# Patient Record
Sex: MALE | Race: WHITE | NOT HISPANIC OR LATINO | Employment: FULL TIME | URBAN - METROPOLITAN AREA
[De-identification: names, ages, dates, MRNs, and addresses within clinical notes are randomized per-mention and may not be internally consistent; named-entity substitution may affect disease eponyms.]

---

## 2019-11-24 ENCOUNTER — HOSPITAL ENCOUNTER (EMERGENCY)
Facility: HOSPITAL | Age: 54
Discharge: HOME | End: 2019-11-24
Attending: EMERGENCY MEDICINE
Payer: COMMERCIAL

## 2019-11-24 VITALS
HEART RATE: 82 BPM | BODY MASS INDEX: 31.62 KG/M2 | HEIGHT: 72 IN | OXYGEN SATURATION: 96 % | TEMPERATURE: 98 F | SYSTOLIC BLOOD PRESSURE: 158 MMHG | DIASTOLIC BLOOD PRESSURE: 87 MMHG | WEIGHT: 233.44 LBS | RESPIRATION RATE: 20 BRPM

## 2019-11-24 DIAGNOSIS — S61.211A LACERATION OF LEFT INDEX FINGER WITHOUT FOREIGN BODY WITHOUT DAMAGE TO NAIL, INITIAL ENCOUNTER: Primary | ICD-10-CM

## 2019-11-24 PROBLEM — I21.4 NSTEMI (NON-ST ELEVATED MYOCARDIAL INFARCTION): Status: ACTIVE | Noted: 2018-07-24

## 2019-11-24 PROBLEM — I21.4 NSTEMI (NON-ST ELEVATED MYOCARDIAL INFARCTION): Status: RESOLVED | Noted: 2018-07-24 | Resolved: 2019-11-24

## 2019-11-24 PROBLEM — K21.9 GERD (GASTROESOPHAGEAL REFLUX DISEASE): Status: ACTIVE | Noted: 2019-10-31

## 2019-11-24 PROBLEM — Z95.5 PRESENCE OF STENT IN CORONARY ARTERY: Status: ACTIVE | Noted: 2018-08-27

## 2019-11-24 PROBLEM — E78.1 HYPERTRIGLYCERIDEMIA: Status: ACTIVE | Noted: 2019-10-31

## 2019-11-24 PROBLEM — I71.20 THORACIC AORTIC ANEURYSM WITHOUT RUPTURE: Status: ACTIVE | Noted: 2019-02-27

## 2019-11-24 PROBLEM — Z96.642 HISTORY OF TOTAL LEFT HIP ARTHROPLASTY: Status: ACTIVE | Noted: 2018-07-24

## 2019-11-24 PROCEDURE — 12001 RPR S/N/AX/GEN/TRNK 2.5CM/<: CPT | Mod: ER

## 2019-11-24 PROCEDURE — 99283 EMERGENCY DEPT VISIT LOW MDM: CPT | Mod: 25,ER

## 2019-11-24 PROCEDURE — 25000003 PHARM REV CODE 250: Mod: ER | Performed by: NURSE PRACTITIONER

## 2019-11-24 RX ORDER — OMEPRAZOLE 20 MG/1
20 CAPSULE, DELAYED RELEASE ORAL DAILY
COMMUNITY

## 2019-11-24 RX ORDER — METOPROLOL SUCCINATE 25 MG/1
25 TABLET, EXTENDED RELEASE ORAL DAILY
COMMUNITY

## 2019-11-24 RX ORDER — ASPIRIN 81 MG/1
81 TABLET ORAL DAILY
COMMUNITY

## 2019-11-24 RX ORDER — RAMIPRIL 2.5 MG/1
2.5 CAPSULE ORAL DAILY
COMMUNITY

## 2019-11-24 RX ORDER — ATORVASTATIN CALCIUM 80 MG/1
80 TABLET, FILM COATED ORAL DAILY
COMMUNITY

## 2019-11-24 RX ORDER — LIDOCAINE HYDROCHLORIDE 10 MG/ML
10 INJECTION, SOLUTION EPIDURAL; INFILTRATION; INTRACAUDAL; PERINEURAL
Status: COMPLETED | OUTPATIENT
Start: 2019-11-24 | End: 2019-11-24

## 2019-11-24 RX ADMIN — LIDOCAINE HYDROCHLORIDE 100 MG: 10 INJECTION, SOLUTION EPIDURAL; INFILTRATION; INTRACAUDAL; PERINEURAL at 02:11

## 2019-11-24 NOTE — ED NOTES
Aaox3, skin warm and dry, resp unlabored and even.amb with steady gait and vickers well.left hand 3rd and 4th finger inner aspect cut mid finger.

## 2019-11-24 NOTE — ED PROVIDER NOTES
Encounter Date: 11/24/2019       History     Chief Complaint   Patient presents with    Laceration     left hand cut with saw just prior to arrival. 3rd and 4th finger     The history is provided by the patient.   Laceration    The incident occurred just prior to arrival. The laceration is located on the left hand (left index finger). The laceration is 1 cm in size. Injury mechanism: cut finger with circular saw. The pain is at a severity of 5/10. The pain has been fluctuating since onset. He reports no foreign bodies present. His tetanus status is UTD (10/31/2019).       PCP:     Aaron Cuenca MD        Review of patient's allergies indicates:  No Known Allergies  Past Medical History:   Diagnosis Date    Coronary artery disease     GERD (gastroesophageal reflux disease) 10/31/2019    History of non-ST elevation myocardial infarction (NSTEMI)     Hypertension     Hypertriglyceridemia 10/31/2019    NSTEMI (non-ST elevated myocardial infarction) 7/24/2018    Osteoarthritis 9/11/2014    Presence of stent in coronary artery 8/27/2018    Psoriasis     Thoracic aortic aneurysm without rupture 2/27/2019     Past Surgical History:   Procedure Laterality Date    HIP ARTHROPLASTY Left      History reviewed. No pertinent family history.  Social History     Tobacco Use    Smoking status: Current Every Day Smoker    Smokeless tobacco: Never Used    Tobacco comment: occas   Substance Use Topics    Alcohol use: Yes     Comment: socially    Drug use: Not on file     Review of Systems   Constitutional: Negative for fever.   HENT: Negative for sore throat.    Respiratory: Negative for shortness of breath.    Cardiovascular: Negative for chest pain.   Gastrointestinal: Negative for nausea.   Genitourinary: Negative for dysuria.   Musculoskeletal: Negative for back pain.   Skin: Positive for wound (laceration of left index finger & abrasion of left middle finger). Negative for rash.   Neurological: Negative for  weakness.   Hematological: Does not bruise/bleed easily.       Physical Exam     Initial Vitals [11/24/19 1418]   BP Pulse Resp Temp SpO2   (!) 158/87 82 20 98.2 °F (36.8 °C) 96 %      MAP       --         Physical Exam    Nursing note and vitals reviewed.  Constitutional: He appears well-developed and well-nourished. He is cooperative. He does not appear ill. No distress.   HENT:   Head: Normocephalic and atraumatic.   Nose: Nose normal.   Mouth/Throat: Uvula is midline, oropharynx is clear and moist and mucous membranes are normal.   Eyes: Conjunctivae, EOM and lids are normal. Pupils are equal, round, and reactive to light.   Neck: Trachea normal and normal range of motion. Neck supple.   Cardiovascular: Normal rate, regular rhythm, intact distal pulses and normal pulses.   Pulmonary/Chest: Effort normal. No accessory muscle usage. No respiratory distress.   Musculoskeletal: Normal range of motion. He exhibits no edema.        Left hand: He exhibits tenderness (at site of laceration - left index finger) and laceration (1 cm laceration noted to palmar aspect of the left index finger just proximal of the DIP - no foreign body noted - wound well-approximated - bleeding controlled with direct pressure - neurovascular intact distally). He exhibits normal range of motion, no bony tenderness, normal two-point discrimination, normal capillary refill and no swelling. Normal sensation noted.        Hands:  The injury does not appear to have damaged any underlying structures including tendons, nerves, blood vessels, and/or joints. There is no suspicion of compartment syndrome or infection at time of examination.    Neurological: He is alert and oriented to person, place, and time. He has normal strength. GCS eye subscore is 4. GCS verbal subscore is 5. GCS motor subscore is 6.   Skin: Skin is warm and dry. Capillary refill takes less than 2 seconds. Abrasion (small abrasion noted to palmar aspect of the left middle finger  "just proximal of DIP - no bleeding or foreign body noted) and laceration (1 cm laceration noted to palmar aspect of the left index finger just proximal of the DIP - no foreign body noted - wound well-approximated - bleeding controlled with direct pressure) noted. No rash noted.   Psychiatric: He has a normal mood and affect. His speech is normal and behavior is normal. Cognition and memory are normal.         ED Course   Lac Repair  Date/Time: 11/24/2019 3:00 PM  Performed by: Galindo Sandy NP  Authorized by: Galindo Sandy NP   Site marked: the operative site was marked  Time out: Immediately prior to procedure a "time out" was called to verify the correct patient, procedure, equipment, support staff and site/side marked as required.  Body area: upper extremity  Location details: left index finger  Laceration length: 1 cm  Foreign bodies: no foreign bodies  Tendon involvement: none  Nerve involvement: none  Vascular damage: no  Anesthesia: local infiltration    Anesthesia:  Local anesthesia used: yes  Local Anesthetic: lidocaine 1% without epinephrine  Anesthetic total: 3 mL  Preparation: Patient was prepped and draped in the usual sterile fashion.  Irrigation solution: saline  Irrigation method: syringe  Amount of cleaning: standard  Debridement: minimal  Degree of undermining: none  Skin closure: 4-0 nylon  Number of sutures: 4  Technique: simple  Approximation: close  Approximation difficulty: simple  Dressing: dressing applied  Patient tolerance: Patient tolerated the procedure well with no immediate complications          ED Medications:   Medications   lidocaine (PF) 10 mg/ml (1%) injection 100 mg (100 mg Other Given 11/24/19 1450)         ED Course Vitals  Vitals:    11/24/19 1418   BP: (!) 158/87   Pulse: 82   Resp: 20   Temp: 98.2 °F (36.8 °C)   TempSrc: Oral   SpO2: 96%   Weight: 105.9 kg (233 lb 7.5 oz)   Height: 6' (1.829 m)         1523 HOURS RE-EVALUATION & DISPOSITION:   Reassessment " at the time of disposition demonstrates that the patient is resting comfortably in no acute distress.  He has remained hemodynamically stable throughout the entire ED visit and is without objective evidence for acute process requiring urgent intervention or hospitalization. I provided counseling to patient with regard to condition, the treatment plan, specific conditions for return, and the importance of follow up.  Answered questions at this time. The patient is stable for discharge.                                                 Clinical Impression:       ICD-10-CM ICD-9-CM   1. Laceration of left index finger without foreign body without damage to nail, initial encounter S61.211A 883.0           Disposition:   Disposition: Discharged  Condition: Stable  I discussed with patient that the evaluation in the emergency department does not suggest any emergent or life threatening medical condition requiring immediate intervention beyond what was provided in the ED, and I believe patient is safe for discharge.  Regardless, an unremarkable evaluation in the ED does not preclude the development or presence of a serious of life threatening condition. As such, patient was instructed to return immediately for any worsening or change in current symptoms. I also discussed the results of my evaluation and diagnostics with patient and he concurs with the evaluation and management plan.  Detailed written and verbal instructions provided to patient and he expressed a verbal understanding of the discharge instructions and overall management plan. Reiterated the importance of medication administration and safety and advised patient to follow up with primary care provider in 3-5 days or sooner if needed.  Also advised patient to return to the ER for any complications.     Regarding LACERATION CARE, patient was instructed to wash hands with soap and warm water before and after caring for wound; keep the wound dry for the first 24 to  48 hours and then gently clean the wound once or twice a day with cool water using soap to clean around the wound; avoid using alcohol or hydrogen peroxide to clean wound unless directed to; and use bandages to keep wound clean and protected and to prevent swelling.  Advised patient to contact primary healthcare provider if wound splits open; becomes extremely painful; appears to not be healing; has a foreign object present; develop a purulent discharge; or note the skin around the wound becoming numb, edematous, or erythematous.  Patient instructed to follow up with primary care provider for wound re-check or closure removal in 8-10 days.        Follow-up Information     Aaron Cuenca MD In 10 days.    Specialty:  Family Medicine  Why:  For suture removal  Contact information:  [address] [place] [address] FAMILY MEDICINE  [facility] [address]  [phone]                                Galindo Sandy NP  11/24/19 9958

## 2020-08-10 ENCOUNTER — HOSPITAL ENCOUNTER (EMERGENCY)
Facility: HOSPITAL | Age: 55
Discharge: HOME | End: 2020-08-10
Attending: EMERGENCY MEDICINE
Payer: COMMERCIAL

## 2020-08-10 VITALS
TEMPERATURE: 98 F | HEART RATE: 59 BPM | BODY MASS INDEX: 31.87 KG/M2 | WEIGHT: 235 LBS | SYSTOLIC BLOOD PRESSURE: 141 MMHG | OXYGEN SATURATION: 97 % | DIASTOLIC BLOOD PRESSURE: 70 MMHG | RESPIRATION RATE: 16 BRPM

## 2020-08-10 VITALS
DIASTOLIC BLOOD PRESSURE: 65 MMHG | WEIGHT: 230 LBS | RESPIRATION RATE: 19 BRPM | OXYGEN SATURATION: 96 % | HEIGHT: 72 IN | BODY MASS INDEX: 31.15 KG/M2 | SYSTOLIC BLOOD PRESSURE: 123 MMHG | HEART RATE: 57 BPM | TEMPERATURE: 98 F

## 2020-08-10 DIAGNOSIS — M54.50 ACUTE RIGHT-SIDED LOW BACK PAIN, UNSPECIFIED WHETHER SCIATICA PRESENT: Primary | ICD-10-CM

## 2020-08-10 DIAGNOSIS — S39.012D LUMBAR STRAIN, SUBSEQUENT ENCOUNTER: Primary | ICD-10-CM

## 2020-08-10 LAB
BILIRUB UR QL STRIP: NEGATIVE
CLARITY UR REFRACT.AUTO: CLEAR
COLOR UR AUTO: YELLOW
GLUCOSE UR QL STRIP: NEGATIVE
HGB UR QL STRIP: NEGATIVE
KETONES UR QL STRIP: NEGATIVE
LEUKOCYTE ESTERASE UR QL STRIP: NEGATIVE
NITRITE UR QL STRIP: NEGATIVE
PH UR STRIP: 6 [PH] (ref 5–8)
PROT UR QL STRIP: NEGATIVE
SP GR UR STRIP: >=1.03 (ref 1–1.03)
URN SPEC COLLECT METH UR: ABNORMAL
UROBILINOGEN UR STRIP-ACNC: NEGATIVE EU/DL

## 2020-08-10 PROCEDURE — 99284 EMERGENCY DEPT VISIT MOD MDM: CPT | Mod: 25,ER

## 2020-08-10 PROCEDURE — 96374 THER/PROPH/DIAG INJ IV PUSH: CPT | Mod: ER

## 2020-08-10 PROCEDURE — 63600175 PHARM REV CODE 636 W HCPCS: Mod: ER | Performed by: EMERGENCY MEDICINE

## 2020-08-10 PROCEDURE — 99284 EMERGENCY DEPT VISIT MOD MDM: CPT | Mod: 25,27,ER

## 2020-08-10 PROCEDURE — 96375 TX/PRO/DX INJ NEW DRUG ADDON: CPT | Mod: ER

## 2020-08-10 PROCEDURE — 96372 THER/PROPH/DIAG INJ SC/IM: CPT | Mod: ER

## 2020-08-10 PROCEDURE — 81003 URINALYSIS AUTO W/O SCOPE: CPT | Mod: ER

## 2020-08-10 RX ORDER — TRAMADOL HYDROCHLORIDE AND ACETAMINOPHEN 37.5; 325 MG/1; MG/1
1 TABLET, FILM COATED ORAL EVERY 6 HOURS PRN
Qty: 12 TABLET | Refills: 0 | OUTPATIENT
Start: 2020-08-10 | End: 2020-08-10

## 2020-08-10 RX ORDER — METHOCARBAMOL 500 MG/1
1000 TABLET, FILM COATED ORAL 3 TIMES DAILY
Qty: 30 TABLET | Refills: 0 | Status: SHIPPED | OUTPATIENT
Start: 2020-08-10 | End: 2020-08-15

## 2020-08-10 RX ORDER — HYDROMORPHONE HYDROCHLORIDE 2 MG/ML
0.5 INJECTION, SOLUTION INTRAMUSCULAR; INTRAVENOUS; SUBCUTANEOUS
Status: COMPLETED | OUTPATIENT
Start: 2020-08-10 | End: 2020-08-10

## 2020-08-10 RX ORDER — OXYCODONE AND ACETAMINOPHEN 10; 325 MG/1; MG/1
1 TABLET ORAL EVERY 6 HOURS PRN
Qty: 12 TABLET | Refills: 0 | Status: SHIPPED | OUTPATIENT
Start: 2020-08-10

## 2020-08-10 RX ORDER — DICLOFENAC SODIUM 75 MG/1
75 TABLET, DELAYED RELEASE ORAL 2 TIMES DAILY
Qty: 10 TABLET | Refills: 1 | Status: SHIPPED | OUTPATIENT
Start: 2020-08-10

## 2020-08-10 RX ORDER — KETOROLAC TROMETHAMINE 30 MG/ML
30 INJECTION, SOLUTION INTRAMUSCULAR; INTRAVENOUS
Status: COMPLETED | OUTPATIENT
Start: 2020-08-10 | End: 2020-08-10

## 2020-08-10 RX ORDER — DEXAMETHASONE SODIUM PHOSPHATE 4 MG/ML
8 INJECTION, SOLUTION INTRA-ARTICULAR; INTRALESIONAL; INTRAMUSCULAR; INTRAVENOUS; SOFT TISSUE
Status: COMPLETED | OUTPATIENT
Start: 2020-08-10 | End: 2020-08-10

## 2020-08-10 RX ORDER — DIAZEPAM 10 MG/2ML
5 INJECTION INTRAMUSCULAR
Status: COMPLETED | OUTPATIENT
Start: 2020-08-10 | End: 2020-08-10

## 2020-08-10 RX ORDER — ORPHENADRINE CITRATE 30 MG/ML
60 INJECTION INTRAMUSCULAR; INTRAVENOUS
Status: COMPLETED | OUTPATIENT
Start: 2020-08-10 | End: 2020-08-10

## 2020-08-10 RX ADMIN — ORPHENADRINE CITRATE 60 MG: 60 INJECTION INTRAMUSCULAR; INTRAVENOUS at 08:08

## 2020-08-10 RX ADMIN — DEXAMETHASONE SODIUM PHOSPHATE 8 MG: 4 INJECTION, SOLUTION INTRAMUSCULAR; INTRAVENOUS at 06:08

## 2020-08-10 RX ADMIN — HYDROMORPHONE HYDROCHLORIDE 0.5 MG: 2 INJECTION, SOLUTION INTRAMUSCULAR; INTRAVENOUS; SUBCUTANEOUS at 05:08

## 2020-08-10 RX ADMIN — KETOROLAC TROMETHAMINE 30 MG: 30 INJECTION, SOLUTION INTRAMUSCULAR; INTRAVENOUS at 08:08

## 2020-08-10 RX ADMIN — DIAZEPAM 5 MG: 10 INJECTION, SOLUTION INTRAMUSCULAR; INTRAVENOUS at 05:08

## 2020-08-10 NOTE — ED PROVIDER NOTES
Encounter Date: 8/10/2020       History     Chief Complaint   Patient presents with    Back Pain     lower back pain onset yesterday     Chief complaint:  low back pain    History of present illness:  55-year-old male with onset of acute right-sided low back pain yesterday.  Patient states he was working on some shoe molding and was in no particular or quit position when he developed right-sided back discomfort.  Upon awakening this morning the patient feels right back stiffness and is unable to walk without significant pain.  He denies any numbness or tingling.  Denies any radiation of the pain into his lower extremities.  Denies any saddle numbness or urinary or fecal incontinence.  Severity of the pain is 10/10.  Location is in the right paralumbar sacral region.  There is no midline discomfort.  Pain is exacerbated by movement.  Pain is relieved by assuming a comfortable posture.  Minimally at best.  No complaints of hematuria or dysuria.        Review of patient's allergies indicates:  No Known Allergies  Past Medical History:   Diagnosis Date    Coronary artery disease     GERD (gastroesophageal reflux disease) 10/31/2019    History of non-ST elevation myocardial infarction (NSTEMI)     Hypertension     Hypertriglyceridemia 10/31/2019    NSTEMI (non-ST elevated myocardial infarction) 7/24/2018    Osteoarthritis 9/11/2014    Presence of stent in coronary artery 8/27/2018    Psoriasis     Thoracic aortic aneurysm without rupture 2/27/2019     Past Surgical History:   Procedure Laterality Date    HIP ARTHROPLASTY Left      History reviewed. No pertinent family history.  Social History     Tobacco Use    Smoking status: Current Every Day Smoker    Smokeless tobacco: Never Used    Tobacco comment: occas   Substance Use Topics    Alcohol use: Yes     Comment: socially    Drug use: Not on file     Review of Systems    Physical Exam     Initial Vitals [08/10/20 0806]   BP Pulse Resp Temp SpO2   (!)  144/76 (!) 55 18 98.4 °F (36.9 °C) 98 %      MAP       --         Physical Exam    Nursing note and vitals reviewed.  Constitutional: He appears well-developed and well-nourished.   HENT:   Head: Atraumatic.   Right Ear: External ear normal.   Left Ear: External ear normal.   Mouth/Throat: Oropharynx is clear and moist.   Eyes: Conjunctivae and EOM are normal. Pupils are equal, round, and reactive to light.   Neck: Normal range of motion. Neck supple.   Cardiovascular: Normal rate, regular rhythm and intact distal pulses.   Pulmonary/Chest: Breath sounds normal.   Abdominal: Soft. He exhibits no distension. There is no abdominal tenderness. There is no rebound.   Genitourinary:    Genitourinary Comments: Absolutely NO CVA tenderness     Musculoskeletal:      Cervical back: Normal.      Thoracic back: Normal.      Lumbar back: He exhibits decreased range of motion, tenderness, bony tenderness, pain and spasm. He exhibits no swelling, no edema, no deformity and no laceration.        Back:       Comments: Tenderness along the L3-L5 border midline.     Neurological: He is alert and oriented to person, place, and time. He has normal strength. He displays normal reflexes. No cranial nerve deficit or sensory deficit. GCS score is 15. GCS eye subscore is 4. GCS verbal subscore is 5. GCS motor subscore is 6.   Skin: Skin is warm and dry. Capillary refill takes less than 2 seconds.   Psychiatric: He has a normal mood and affect. His behavior is normal. Thought content normal.         ED Course   Procedures  Labs Reviewed - No data to display       Imaging Results    None       X-Rays:   Independently Interpreted Readings:   Other Readings:  MRI reviewed and reveals L5-S1 and L4-5 issues but no nerve root compression. Radiologist report noted    Medical Decision Making:   Independently Interpreted Test(s):   I have ordered and independently interpreted X-rays - see prior notes.  ED Management:  In lieu of vague urinary issue  with hesitancy and degree of pain with no improvement and continuing discomfort I opted for MRI evaluation .  No significant issue with spine on MRI and likely sever spasm with no relation to urinary issue.  Ureteral stone seems unlikely as patient had no CVA tenderness and Urinalysis was pristine.  He did get relief and was advised to resume prior instructions from earlier visit this morning                                 Clinical Impression:                 ICD-10-CM ICD-9-CM   1. Lumbar strain, subsequent encounter  S39.012D V58.89     847.2                  Abel Thorpe MD  08/11/20 1629       Abel Thorpe MD  08/11/20 1638

## 2020-08-10 NOTE — ED PROVIDER NOTES
"Encounter Date: 8/10/2020       History     Chief Complaint   Patient presents with    Back Pain     lower back pain since yest. seen today and not getting better.     Chief complaint: Back pain    History of present Illness: 54 y/o male with c/o continued back pain with no improvement since seen earlier this morning. Patient has taken meds as instructed and no signficn dent in pain. He relates some difficulty urinating but no incontinence of stool or urine and no saddle numbness . Feels pain is "deep" and unrelenting. Again no specific trauma to the back and pain out of proportion to clinical findings although ROM is limited. Pain does radiate to right buttock but localized to the right paralumbar area. No hematuria or dysuria.        Review of patient's allergies indicates:  No Known Allergies  Past Medical History:   Diagnosis Date    Coronary artery disease     GERD (gastroesophageal reflux disease) 10/31/2019    History of non-ST elevation myocardial infarction (NSTEMI)     Hypertension     Hypertriglyceridemia 10/31/2019    NSTEMI (non-ST elevated myocardial infarction) 7/24/2018    Osteoarthritis 9/11/2014    Presence of stent in coronary artery 8/27/2018    Psoriasis     Thoracic aortic aneurysm without rupture 2/27/2019     Past Surgical History:   Procedure Laterality Date    HIP ARTHROPLASTY Left      No family history on file.  Social History     Tobacco Use    Smoking status: Current Every Day Smoker    Smokeless tobacco: Never Used    Tobacco comment: occas   Substance Use Topics    Alcohol use: Yes     Comment: socially    Drug use: Not on file     Review of Systems   Constitutional: Positive for activity change (Decreased secondary to degree of pain).   Genitourinary: Negative for decreased urine volume, difficulty urinating, dysuria, enuresis, flank pain, frequency, genital sores, hematuria, testicular pain and urgency.   Musculoskeletal: Positive for back pain and gait problem (due " to pain).   Neurological: Negative for tremors, weakness and numbness.   All other systems reviewed and are negative.      Physical Exam     Initial Vitals [08/10/20 1627]   BP Pulse Resp Temp SpO2   (!) 143/71 (!) 58 20 97.7 °F (36.5 °C) 98 %      MAP       --         Physical Exam    Nursing note and vitals reviewed.  Constitutional: He appears well-developed and well-nourished. No distress.   HENT:   Head: Normocephalic and atraumatic.   Right Ear: External ear normal.   Left Ear: External ear normal.   Eyes: Conjunctivae and EOM are normal.   Neck: Normal range of motion. Neck supple.   Cardiovascular: Normal rate, regular rhythm and intact distal pulses.   Pulmonary/Chest: No respiratory distress.   Abdominal: He exhibits no distension. There is no abdominal tenderness. There is no rebound and no guarding.   Genitourinary:    Genitourinary Comments: No CVA tenderness     Neurological: He is alert and oriented to person, place, and time. He has normal strength. No sensory deficit. GCS score is 15. GCS eye subscore is 4. GCS verbal subscore is 5. GCS motor subscore is 6.   Skin: Skin is warm and dry.   Psychiatric: Judgment and thought content normal.   Flat affect         ED Course   Procedures  Labs Reviewed - No data to display       Imaging Results    None          Medical Decision Making:   Initial Assessment:   Appears more comfortable than prior visit  Differential Diagnosis:   Lumbar strain/radiculopathy  Clinical Tests:   Lab Tests: Ordered  Radiological Study: Ordered and Reviewed  ED Management:  MRI ordered in lieu of intractability and prior plain films negative.                                   Clinical Impression:             ICD-10-CM ICD-9-CM   1. Acute right-sided low back pain, unspecified whether sciatica present  M54.5 724.2          Abel Thorpe MD  08/12/20 1409

## 2022-11-04 ENCOUNTER — HOSPITAL ENCOUNTER (OUTPATIENT)
Dept: RADIOLOGY | Facility: HOSPITAL | Age: 57
Discharge: HOME | End: 2022-11-04
Attending: INTERNAL MEDICINE
Payer: COMMERCIAL

## 2022-11-04 DIAGNOSIS — R26.89 IMBALANCE: ICD-10-CM

## 2022-11-04 PROCEDURE — 70450 CT HEAD/BRAIN W/O DYE: CPT | Mod: 26,,, | Performed by: RADIOLOGY

## 2022-11-04 PROCEDURE — 70450 CT HEAD/BRAIN W/O DYE: CPT | Mod: TC,PO

## 2022-11-04 PROCEDURE — 70450 CT HEAD WITHOUT CONTRAST: ICD-10-PCS | Mod: 26,,, | Performed by: RADIOLOGY

## 2022-11-09 ENCOUNTER — HOSPITAL ENCOUNTER (OUTPATIENT)
Dept: RADIOLOGY | Facility: HOSPITAL | Age: 57
Discharge: HOME | End: 2022-11-09
Attending: INTERNAL MEDICINE
Payer: COMMERCIAL

## 2022-11-09 DIAGNOSIS — M54.6 ACUTE MIDLINE THORACIC BACK PAIN: ICD-10-CM

## 2022-11-09 DIAGNOSIS — R26.89 IMBALANCE: ICD-10-CM

## 2022-11-09 DIAGNOSIS — G44.52 NEW DAILY PERSISTENT HEADACHE: ICD-10-CM

## 2022-11-09 PROCEDURE — 72146 MRI THORACIC SPINE WITHOUT CONTRAST: ICD-10-PCS | Mod: 26,,, | Performed by: RADIOLOGY

## 2022-11-09 PROCEDURE — 70551 MRI BRAIN STEM W/O DYE: CPT | Mod: 26,,, | Performed by: RADIOLOGY

## 2022-11-09 PROCEDURE — 72146 MRI CHEST SPINE W/O DYE: CPT | Mod: 26,,, | Performed by: RADIOLOGY

## 2022-11-09 PROCEDURE — 72146 MRI CHEST SPINE W/O DYE: CPT | Mod: TC,PO

## 2022-11-09 PROCEDURE — 70551 MRI BRAIN STEM W/O DYE: CPT | Mod: TC,PO

## 2022-11-09 PROCEDURE — 70551 MRI BRAIN WITHOUT CONTRAST: ICD-10-PCS | Mod: 26,,, | Performed by: RADIOLOGY

## 2023-01-12 ENCOUNTER — HOSPITAL ENCOUNTER (OUTPATIENT)
Dept: RADIOLOGY | Facility: HOSPITAL | Age: 58
Discharge: HOME | End: 2023-01-12
Attending: INTERNAL MEDICINE
Payer: COMMERCIAL

## 2023-01-12 DIAGNOSIS — R63.4 UNEXPLAINED WEIGHT LOSS: ICD-10-CM

## 2023-01-12 PROCEDURE — 25500020 PHARM REV CODE 255: Mod: PO

## 2023-01-12 PROCEDURE — 74150 CT ABDOMEN W/O CONTRAST: CPT | Mod: 26,,, | Performed by: RADIOLOGY

## 2023-01-12 PROCEDURE — 74150 CT ABDOMEN W/O CONTRAST: CPT | Mod: TC,PO

## 2023-01-12 PROCEDURE — 74150 CT ABDOMEN WITHOUT CONTRAST: ICD-10-PCS | Mod: 26,,, | Performed by: RADIOLOGY

## 2023-01-12 RX ADMIN — IOHEXOL 30 ML: 350 INJECTION, SOLUTION INTRAVENOUS at 10:01

## 2023-03-03 ENCOUNTER — HOSPITAL ENCOUNTER (OUTPATIENT)
Dept: RADIOLOGY | Facility: HOSPITAL | Age: 58
Discharge: HOME | End: 2023-03-03
Attending: INTERNAL MEDICINE
Payer: COMMERCIAL

## 2023-03-03 DIAGNOSIS — M25.552 LEFT HIP PAIN: ICD-10-CM

## 2023-03-03 PROCEDURE — 73502 XR HIP WITH PELVIS WHEN PERFORMED, 2 OR 3 VIEWS LEFT: ICD-10-PCS | Mod: 26,LT,, | Performed by: RADIOLOGY

## 2023-03-03 PROCEDURE — 73502 X-RAY EXAM HIP UNI 2-3 VIEWS: CPT | Mod: 26,LT,, | Performed by: RADIOLOGY

## 2023-03-03 PROCEDURE — 73502 X-RAY EXAM HIP UNI 2-3 VIEWS: CPT | Mod: TC,PO,LT

## 2023-12-15 ENCOUNTER — HOSPITAL ENCOUNTER (OUTPATIENT)
Dept: RADIOLOGY | Facility: HOSPITAL | Age: 58
Discharge: HOME | End: 2023-12-15
Attending: INTERNAL MEDICINE
Payer: COMMERCIAL

## 2023-12-15 DIAGNOSIS — Z01.818 OTHER SPECIFIED PRE-OPERATIVE EXAMINATION: Primary | ICD-10-CM

## 2023-12-15 DIAGNOSIS — Z01.818 OTHER SPECIFIED PRE-OPERATIVE EXAMINATION: ICD-10-CM

## 2023-12-15 PROCEDURE — 71046 XR CHEST PA AND LATERAL: ICD-10-PCS | Mod: 26,,, | Performed by: RADIOLOGY

## 2023-12-15 PROCEDURE — 71046 X-RAY EXAM CHEST 2 VIEWS: CPT | Mod: 26,,, | Performed by: RADIOLOGY

## 2023-12-15 PROCEDURE — 71046 X-RAY EXAM CHEST 2 VIEWS: CPT | Mod: TC,PO

## 2024-04-11 ENCOUNTER — HOSPITAL ENCOUNTER (EMERGENCY)
Facility: HOSPITAL | Age: 59
Discharge: HOME | End: 2024-04-11
Attending: EMERGENCY MEDICINE
Payer: COMMERCIAL

## 2024-04-11 VITALS
OXYGEN SATURATION: 99 % | HEIGHT: 72 IN | TEMPERATURE: 98 F | BODY MASS INDEX: 29.5 KG/M2 | HEART RATE: 67 BPM | WEIGHT: 217.81 LBS | DIASTOLIC BLOOD PRESSURE: 72 MMHG | RESPIRATION RATE: 17 BRPM | SYSTOLIC BLOOD PRESSURE: 121 MMHG

## 2024-04-11 DIAGNOSIS — I83.91 VARICOSE VEINS OF RIGHT CALF: ICD-10-CM

## 2024-04-11 DIAGNOSIS — M25.561 RIGHT KNEE PAIN: ICD-10-CM

## 2024-04-11 DIAGNOSIS — S80.251A: Primary | ICD-10-CM

## 2024-04-11 PROCEDURE — 25000003 PHARM REV CODE 250: Mod: ER | Performed by: EMERGENCY MEDICINE

## 2024-04-11 PROCEDURE — 99284 EMERGENCY DEPT VISIT MOD MDM: CPT | Mod: 25,ER

## 2024-04-11 RX ORDER — DICLOFENAC SODIUM 10 MG/G
2 GEL TOPICAL 4 TIMES DAILY PRN
Qty: 350 G | Refills: 0 | Status: SHIPPED | OUTPATIENT
Start: 2024-04-11

## 2024-04-11 RX ORDER — ACETAMINOPHEN 325 MG/1
650 TABLET ORAL
Status: COMPLETED | OUTPATIENT
Start: 2024-04-11 | End: 2024-04-11

## 2024-04-11 RX ORDER — HYDROCODONE BITARTRATE AND ACETAMINOPHEN 7.5; 325 MG/1; MG/1
1 TABLET ORAL EVERY 6 HOURS PRN
Qty: 12 TABLET | Refills: 0 | Status: SHIPPED | OUTPATIENT
Start: 2024-04-11

## 2024-04-11 RX ADMIN — ACETAMINOPHEN 650 MG: 325 TABLET ORAL at 07:04

## 2024-04-11 NOTE — ED PROVIDER NOTES
"Emergency Medicine Provider Note - 4/11/2024       History     Chief Complaint   Patient presents with    Knee Pain     Started with knee pain and swelling lastnight. States "it felt like it locked up".        Allergies:  Review of patient's allergies indicates:  No Known Allergies     History of Present Illness   HPI    4/11/2024, 6:50 AM  The history is provided by the patient    Branden Bain is a 59 y.o. male presenting to the ED for right knee pain.  Patient reports that he has had several years duration of his right knee popping.  However last night at 1:00 a.m. in the morning, his knee got stuck in a flexed position.  He states that he is able to extend it, but it causes significant pain in the right knee.  It is not associated with any fever, rash, paresthesias.  The pain is aggravated by extending the right knee.  Patient notes that he had walked into the hitch of a trailer about a week ago.  He developed a knot on the anterior right tib-fib.  The bump has not gone away.  Patient denies any chest pain, chest pressure, shortness of breath.  No prior history of DVT      Arrival mode: Private Vehicle     PCP: Yury Henry MD     Past Medical History:  Past Medical History:   Diagnosis Date    Coronary artery disease     GERD (gastroesophageal reflux disease) 10/31/2019    History of non-ST elevation myocardial infarction (NSTEMI)     Hypertension     Hypertriglyceridemia 10/31/2019    NSTEMI (non-ST elevated myocardial infarction) 7/24/2018    Osteoarthritis 9/11/2014    Presence of stent in coronary artery 8/27/2018    Psoriasis     Thoracic aortic aneurysm without rupture 2/27/2019       Past Surgical History:  Past Surgical History:   Procedure Laterality Date    HIP ARTHROPLASTY Left          Family History:  No family history on file.    Social History:  Social History     Tobacco Use    Smoking status: Every Day    Smokeless tobacco: Never    Tobacco comments:     occas   Substance and Sexual " Activity    Alcohol use: Yes     Comment: socially    Drug use: Not on file    Sexual activity: Never        Review of Systems   Review of Systems   Respiratory:  Negative for shortness of breath.    Cardiovascular:  Negative for chest pain.   Musculoskeletal:  Positive for arthralgias ((+) Right knee (-)  Right hip (-) right ankle).   Skin:  Negative for rash.   Neurological:  Negative for weakness and numbness.        Physical Exam     Initial Vitals [04/11/24 0649]   BP Pulse Resp Temp SpO2   129/71 70 17 97.9 °F (36.6 °C) 99 %      MAP       --          Physical Exam  Musculoskeletal:      Right knee: No swelling, effusion, erythema, ecchymosis, bony tenderness or crepitus. Decreased range of motion (Patient and position of comfort with the right knee flexed.). No LCL laxity, MCL laxity, ACL laxity or PCL laxity. Normal alignment and normal patellar mobility. Normal pulse.      Instability Tests: Anterior drawer test negative. Posterior drawer test negative. Lateral Pal test negative.      Right lower leg: Swelling present.      Right ankle: Normal. No swelling, deformity or ecchymosis. No tenderness. Normal range of motion.      Right foot: Normal. Normal capillary refill. No swelling or deformity. Normal pulse.        Legs:       Comments: Prominent varicose veins are noted to the right lateral tib-fib.         Nursing Notes and Vital Signs Reviewed.  Constitutional: Patient is in no apparent distress. Well-developed and well-nourished.  Head: Atraumatic. Normocephalic.  Eyes: PERRL. EOM intact. Conjunctivae are not pale. No scleral icterus.  ENT: Mucous membranes are moist. Oropharynx is clear and symmetric.    Cardiovascular: Regular rate. Regular rhythm. No murmurs, rubs, or gallops. Distal pulses are 2+ and symmetric.  Pulmonary/Chest: No respiratory distress. Clear to auscultation bilaterally. No wheezing or rales.  Musculoskeletal: Moves all extremities. No obvious deformities. No edema. No calf  tenderness.    Skin: Warm and dry.  Neurological:  Alert, awake, and appropriate.  Normal speech.  No acute focal neurological deficits are appreciated.  Psychiatric: Normal affect. Good eye contact. Appropriate in content.     ED Course   ED Procedures:  Splint Application    Date/Time: 4/11/2024 9:07 AM    Performed by: Bharath Vergara RN  Authorized by: Winter Yip DO  Location details: right knee  Supplies used: Commercial knee immobilizer.  Patient tolerance: Patient tolerated the procedure well with no immediate complications          ED Vital Signs:  Vitals:    04/11/24 0649 04/11/24 0905   BP: 129/71 121/72   Pulse: 70 67   Resp: 17 17   Temp: 97.9 °F (36.6 °C)    TempSrc: Oral    SpO2: 99% 99%   Weight: 98.8 kg (217 lb 13 oz)    Height: 6' (1.829 m)        Abnormal Lab Results:  Labs Reviewed - No data to display     All Lab Results:  None        Imaging Results:  Imaging Results              US Lower Extremity Veins Right (Final result)  Result time 04/11/24 08:28:16      Final result by Balwinder Rust MD (04/11/24 08:28:16)                   Impression:      No evidence of deep venous thrombosis in the right lower extremity.      Electronically signed by: Balwinder Rust  Date:    04/11/2024  Time:    08:28               Narrative:    EXAMINATION:  US LOWER EXTREMITY VEINS RIGHT    CLINICAL HISTORY:  Asymptomatic varicose veins of right lower extremity    TECHNIQUE:  Duplex and color flow Doppler evaluation and graded compression of the right lower extremity veins was performed.    COMPARISON:  None    FINDINGS:  Right thigh veins: The common femoral, femoral, popliteal, upper greater saphenous, and deep femoral veins are patent and free of thrombus. The veins are normally compressible and have normal phasic flow and augmentation response.    Right calf veins: The visualized calf veins are patent.    Contralateral CFV: The contralateral (left) common femoral vein is patent and free of  thrombus.    Miscellaneous: None                                       X-Ray Knee Complete 4 Or More Views Right (Final result)  Result time 04/11/24 07:33:30      Final result by Balwinder Rust MD (04/11/24 07:33:30)                   Impression:      As above.      Electronically signed by: Balwinder Rust  Date:    04/11/2024  Time:    07:33               Narrative:    EXAMINATION:  XR KNEE COMP 4 OR MORE VIEWS RIGHT    CLINICAL HISTORY:  Pain in right knee    TECHNIQUE:  Four views right knee radiographs    COMPARISON:  None    FINDINGS:  No acute displaced fracture.  No traumatic malalignment.  No osseous destructive process.  Question mild soft tissue swelling anteriorly.  Trace joint effusion.                                       Type of Interpretation: ED Physician (Independently Interpreted).  Radiology Procedure Done: Right Knee.  Interpretation: Foreign body noted in the soft tissue.  No fracture.  Questionable joint effusion          The Emergency Provider reviewed the vital signs and test results, which are outlined above.     ED Discussion   ED Medication(s):  Medications   acetaminophen tablet 650 mg (650 mg Oral Given 4/11/24 0720)       ED Course as of 04/11/24 1037   u Apr 11, 2024   [address] [facility] in [place]. [place] [LB]   0854 Discussed x-ray findings:  Patient is aware of foreign body in right knee subcutaneous. [LB]      ED Course User Index  [LB] Winter Yip,             8:55 AM  Reassessment: Dr. Yip reassessed the pt.  The pt is resting comfortably and is NAD.  Pt states their sx have improved. Discussed test results, shared treatment plan, specific conditions for return, and the need for f/u.  Answered their questions at this time.  Pt understands and agrees to the plan.  The pt has remained hemodynamically stable through ED course and is stable for discharge.    I discussed with patient and/or family/caretaker that evaluation in the ED does not suggest any emergent  or life threatening medical conditions requiring immediate intervention beyond what was provided in the ED, and I believe patient is safe for discharge.  Regardless, an unremarkable evaluation in the ED does not preclude the development or presence of a serious of life threatening condition. As such, patient was instructed to return immediately for any worsening or change in current symptoms.     MIPS Measures     Smoker? Yes     Hypertension: History of Hypertension: The patient has elevated blood pressure (higher than 120/80) while being treated in the ED but has a history of hypertension.      E-Qual Opioid Initiative-OUD    Intent:  Reducing Opioid Associated Harm through safe prescribing.  Justification: Improve opioid prescribing safety, adopt harm reduction strategies, and implement alterative to opioids.    Best Practice:  Opioids were limited to the shortest duration possible, Patient educated on opioid risks and realistic benefits, Non-opioid pain relievers recommended, and State  checked prior to prescribing    Regarding  OPIOID PAIN MEDICATION  counseling. The patient/family was educated on the risks of using opioid pain medications.   These risks include, but not limited to:  1/50 chance of addiction, increase risk of falls/injury, gastrointestinal issues (Constipation),  or accidental death.  Patient/Family was educated on  pain medication Interactions. Do not take any sedative medications (benadryl, ativan, xanax, Klonopin, trazadone); medicine for sleep; other pain pills (lortab, percocet), alcohol, with your narcotic prescription.  This could lead to an accidental overdose resulting in permanent disability or possible death.  Patient/Family was encouraged to use non-narcotic alternatives and to limit the use of narcotic medication.     Medical Decision Making                 Medical Decision Making  Differential diagnosis:  Overuse syndrome, osteoarthritis, torn meniscus, DVT    Amount and/or  "Complexity of Data Reviewed  Radiology: ordered and independent interpretation performed. Decision-making details documented in ED Course.    Risk  OTC drugs.  Prescription drug management.        Coding    Prescription Management: I performed a review of the patient's current Rx medication list as input by nursing staff.    Discharge Medication List as of 4/11/2024  8:59 AM        START taking these medications    Details   diclofenac sodium (VOLTAREN) 1 % Gel Apply 2 g topically 4 (four) times daily as needed (Right knee pain)., Starting Thu 4/11/2024, Normal      HYDROcodone-acetaminophen (NORCO) 7.5-325 mg per tablet Take 1 tablet by mouth every 6 (six) hours as needed for Pain., Starting Thu 4/11/2024, Normal           CONTINUE these medications which have NOT CHANGED    Details   aspirin (ECOTRIN) 81 MG EC tablet Take 81 mg by mouth once daily., Historical Med      atorvastatin (LIPITOR) 80 MG tablet Take 80 mg by mouth once daily., Historical Med      metoprolol succinate (TOPROL-XL) 25 MG 24 hr tablet Take 25 mg by mouth once daily., Historical Med      omeprazole (PRILOSEC) 20 MG capsule Take 20 mg by mouth once daily., Historical Med      ramipril (ALTACE) 2.5 MG capsule Take 2.5 mg by mouth once daily., Historical Med           STOP taking these medications       diclofenac (VOLTAREN) 75 MG EC tablet Comments:   Reason for Stopping:         oxyCODONE-acetaminophen (PERCOCET)  mg per tablet Comments:   Reason for Stopping:                Discussed case with:N/A      Portions of this note may have been created with voice recognition software. Occasional "wrong-word" or "sound-a-like" substitutions may have occurred due to the inherent limitations of voice recognition software. Please, read the note carefully and recognize, using context, where substitutions have occurred.          Clinical Impression       ICD-10-CM ICD-9-CM   1. Foreign body of skin of right knee  S80.251A 916.6   2. Right knee pain  " M25.561 719.46   3. Varicose veins of right calf  I83.91 454.9   4. Right knee pain  M25.561 719.46   5. Varicose veins of right calf  I83.91 454.9        Disposition        Disposition: Discharge to home  Patient condition: Stable      ED Follow-up      Follow-up Information       Clinic, [facility] In 2 days.    Specialty: Orthopedic Surgery  Why: Return to emergency department for calf pain, calf swelling, numbness or tingling, redness, fever, chest pain, chest pressure, shortness of breath, difficulty breathing, or other concerns  Contact information:  [address] [facility] [address]  [phone]                                      Winter Yip,   04/11/24 1037

## 2024-04-11 NOTE — DISCHARGE INSTRUCTIONS
Abdomen , soft, nontender, nondistended , no guarding or rigidity , no masses palpable , normal bowel sounds , Liver and Spleen , no hepatomegaly present , no hepatosplenomegaly , liver nontender , spleen not palpable   LARGE SKIN TAG Regarding  OPIOID PAIN MEDICATION  counseling. The patient/family was educated on the risks of using opioid pain medications.   These risks include, but not limited to:  1/50 chance of addiction, increase risk of falls/injury, gastrointestinal issues (Constipation),  or accidental death.  Patient/Family was educated on  pain medication Interactions.  Do not take any sedative medications (benadryl, ativan, xanax, Klonopin, trazadone); medicine for sleep; other pain pills (lortab, percocet), alcohol, with your narcotic prescription.  This could lead to an accidental overdose resulting in permanent disability or possible death.  Patient/Family was encouraged to use non-narcotic alternatives and to limit the use of narcotic medication.